# Patient Record
Sex: FEMALE | Race: ASIAN
[De-identification: names, ages, dates, MRNs, and addresses within clinical notes are randomized per-mention and may not be internally consistent; named-entity substitution may affect disease eponyms.]

---

## 2019-03-20 ENCOUNTER — APPOINTMENT (OUTPATIENT)
Dept: NEUROSURGERY | Facility: CLINIC | Age: 34
End: 2019-03-20
Payer: MEDICAID

## 2019-03-20 VITALS — WEIGHT: 115 LBS | BODY MASS INDEX: 20.38 KG/M2 | HEIGHT: 63 IN

## 2019-03-20 DIAGNOSIS — Z78.9 OTHER SPECIFIED HEALTH STATUS: ICD-10-CM

## 2019-03-20 DIAGNOSIS — M51.26 OTHER INTERVERTEBRAL DISC DISPLACEMENT, LUMBAR REGION: ICD-10-CM

## 2019-03-20 DIAGNOSIS — M47.816 SPONDYLOSIS W/OUT MYELOPATHY OR RADICULOPATHY, LUMBAR REGION: ICD-10-CM

## 2019-03-20 PROBLEM — Z00.00 ENCOUNTER FOR PREVENTIVE HEALTH EXAMINATION: Status: ACTIVE | Noted: 2019-03-20

## 2019-03-20 PROCEDURE — 99203 OFFICE O/P NEW LOW 30 MIN: CPT

## 2019-03-20 NOTE — REASON FOR VISIT
[Consultation] : a consultation visit [Referred By: _________] : Patient was referred by ALYSSA [FreeTextEntry1] : [id number] [FreeTextEntry3] : \par Mandarin

## 2019-03-20 NOTE — PHYSICAL EXAM
[General Appearance - Alert] : alert [General Appearance - In No Acute Distress] : in no acute distress [General Appearance - Well Nourished] : well nourished [General Appearance - Well Developed] : well developed [General Appearance - Well-Appearing] : healthy appearing [Oriented To Time, Place, And Person] : oriented to person, place, and time [Affect] : the affect was normal [Straight-Leg Raise Test - Right] : straight leg raise of the right leg was positive [Antalgic] : antalgic [Able to toe walk] : the patient was able to toe walk [Able to heel walk] : the patient was able to heel walk [Intact] : all reflexes within normal limits bilaterally [FreeTextEntry1] : She is very thin.  [Straight-Leg Raise Test - Left] : straight leg raise of the left leg was negative [Restricted] : was not restricted [Pain] : was painless

## 2019-03-20 NOTE — PLAN
[FreeTextEntry1] : We have had a thorough discussion regarding her current condition findings and treatment options. She is aware of her MRI findings. I believe her right lower extremity radiculopathy is due to the foraminal disc herniation on the right at L4/5. She will continue with physical therapy for now. If no improvement injection treatments can be considers versus surgical intervention. I will see her back in 6-8 weeks for reassessment. She will call barring any issues.

## 2019-03-20 NOTE — REVIEW OF SYSTEMS
[Numbness] : numbness [Tingling] : tingling [Joint Pain] : joint pain [Joint Stiffness] : joint stiffness [Limb Pain] : limb pain [Negative] : Heme/Lymph

## 2019-03-20 NOTE — DATA REVIEWED
[de-identified] : \par -  MRI of the lumbar spine, 3/15/19: She is found to have degenerative disc disease at L4/5 and L5/S1. At L4/5 there is a right sided foraminal disc herniation which is causing right lateral recess and foraminal stenosis.

## 2019-05-22 ENCOUNTER — APPOINTMENT (OUTPATIENT)
Dept: NEUROSURGERY | Facility: CLINIC | Age: 34
End: 2019-05-22